# Patient Record
Sex: MALE | Race: ASIAN | NOT HISPANIC OR LATINO | URBAN - METROPOLITAN AREA
[De-identification: names, ages, dates, MRNs, and addresses within clinical notes are randomized per-mention and may not be internally consistent; named-entity substitution may affect disease eponyms.]

---

## 2018-03-12 ENCOUNTER — COMMUNICATION - HEALTHEAST (OUTPATIENT)
Dept: TELEHEALTH | Facility: CLINIC | Age: 38
End: 2018-03-12

## 2018-03-12 ENCOUNTER — OFFICE VISIT - HEALTHEAST (OUTPATIENT)
Dept: FAMILY MEDICINE | Facility: CLINIC | Age: 38
End: 2018-03-12

## 2018-03-12 DIAGNOSIS — F17.200 SMOKING: ICD-10-CM

## 2018-03-12 DIAGNOSIS — M54.9 BACKACHE: ICD-10-CM

## 2018-03-12 ASSESSMENT — MIFFLIN-ST. JEOR: SCORE: 1608.17

## 2021-05-31 VITALS — WEIGHT: 167.19 LBS | BODY MASS INDEX: 26.87 KG/M2 | HEIGHT: 66 IN

## 2021-06-16 NOTE — PROGRESS NOTES
"Chief Complaint   Patient presents with     Back Pain     bent over to  jacket and felt a sharp pain that radiated down leg x 3 days       HPI: Very pleasant 37-year-old male presents today with back pain.  He has sharp constant pain in the lumbar spine for 3 days in duration.  The pain radiates slightly to the left buttock.  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    ROS: No bowel or bladder issues.  All 12 systems reviewed and are otherwise negative.    SH: The Patient's  reports that he has been smoking Cigarettes.  He started smoking about 2 years ago. He has been smoking about 0.20 packs per day. He has never used smokeless tobacco.     FH: Strong family history of heart disease    Meds:  Danielito Obando has a current medication list which includes the following prescription(s): celecoxib and cyclobenzaprine.    O:  /90  Pulse 95  Resp 18  Ht 5' 5.5\" (1.664 m)  Wt 167 lb 3 oz (75.8 kg)  SpO2 98%  BMI 27.4 kg/m2  Examination shows patient alert conversant no acute distress  Skin Pink and dry  Examination lumbar spine shows muscles are tight but no midline tenderness.  No erythema.  He has good normal range of motion to rotation and lateral motion but decreased flexion.  He is able to walk, ambulate and appears comfortable in the room  Neuro-moves all 4 extremities, no lower motor neuron weakness, pupils equal round react light accommodation  Psych-alert and oriented ×3 with good memory insight and judgment    A/P:   1. Backache  -Most likely musculoskeletal.  - cyclobenzaprine (FLEXERIL) 10 MG tablet; Take 2 tablets (20 mg total) by mouth at bedtime for 10 days.  Dispense: 20 tablet; Refill: 0  - celecoxib (CELEBREX) 200 MG capsule; Take 1 capsule (200 mg total) by mouth daily for 14 days. To be taken for pain  Dispense: 14 capsule; Refill: 0    2. Smoking  -Encouraged smoking cessation    Patient will follow-up in the next 2-3 weeks for full physical " exam